# Patient Record
Sex: MALE | Race: WHITE | Employment: UNEMPLOYED | URBAN - NONMETROPOLITAN AREA
[De-identification: names, ages, dates, MRNs, and addresses within clinical notes are randomized per-mention and may not be internally consistent; named-entity substitution may affect disease eponyms.]

---

## 2023-03-07 ENCOUNTER — HOSPITAL ENCOUNTER (EMERGENCY)
Age: 23
Discharge: HOME | End: 2023-03-07
Payer: OTHER GOVERNMENT

## 2023-03-07 ENCOUNTER — APPOINTMENT (OUTPATIENT)
Dept: GENERAL RADIOLOGY | Age: 23
End: 2023-03-07

## 2023-03-07 VITALS
OXYGEN SATURATION: 100 % | WEIGHT: 163.6 LBS | TEMPERATURE: 98.1 F | SYSTOLIC BLOOD PRESSURE: 124 MMHG | DIASTOLIC BLOOD PRESSURE: 67 MMHG | BODY MASS INDEX: 21 KG/M2 | HEIGHT: 74 IN | HEART RATE: 95 BPM | RESPIRATION RATE: 14 BRPM

## 2023-03-07 DIAGNOSIS — S86.911A STRAIN OF RIGHT KNEE, INITIAL ENCOUNTER: Primary | ICD-10-CM

## 2023-03-07 PROCEDURE — 99202 OFFICE O/P NEW SF 15 MIN: CPT | Performed by: NURSE PRACTITIONER

## 2023-03-07 PROCEDURE — 73564 X-RAY EXAM KNEE 4 OR MORE: CPT

## 2023-03-07 PROCEDURE — 99203 OFFICE O/P NEW LOW 30 MIN: CPT

## 2023-03-07 RX ORDER — IBUPROFEN 800 MG/1
800 TABLET ORAL EVERY 8 HOURS PRN
Qty: 30 TABLET | Refills: 0 | Status: SHIPPED | OUTPATIENT
Start: 2023-03-07 | End: 2023-03-17

## 2023-03-07 ASSESSMENT — ENCOUNTER SYMPTOMS
SORE THROAT: 0
SHORTNESS OF BREATH: 0
DIARRHEA: 0
EYE DISCHARGE: 0
VOMITING: 0
EYE REDNESS: 0
NAUSEA: 0
TROUBLE SWALLOWING: 0
RHINORRHEA: 0
COUGH: 0

## 2023-03-07 ASSESSMENT — PAIN SCALES - GENERAL: PAINLEVEL_OUTOF10: 7

## 2023-03-07 ASSESSMENT — PAIN - FUNCTIONAL ASSESSMENT: PAIN_FUNCTIONAL_ASSESSMENT: 0-10

## 2023-03-07 ASSESSMENT — PAIN DESCRIPTION - ORIENTATION: ORIENTATION: RIGHT

## 2023-03-07 ASSESSMENT — PAIN DESCRIPTION - LOCATION: LOCATION: KNEE

## 2023-03-07 NOTE — DISCHARGE INSTRUCTIONS
Take medications as prescribed. Use RICE for pain: Rest, Ice, Compression, Elevation. You may use Tylenol or Motrin per package instructions for pain. You may use a heating pad or ice for 15 minutes 2-3 times daily as needed for comfort. Seek emergency treatment for fever greater than 101.5 for greater than 3 days, increased pain, or new or unusual symptoms.

## 2023-03-07 NOTE — ED PROVIDER NOTES
[facility]  Urgent Care Encounter      CHIEF COMPLAINT       Chief Complaint   Patient presents with    Knee Pain     \" It gave out under me while painting ( not at work)\"       Nurses Notes reviewed and I agree except as noted in the HPI. HISTORY OF [address] Sheron Connell is a 25 y.o. male who presents with right knee pain that started approximately 1 week ago. Patient states he was painting a board on his house and felt the knee \"give out\". He had instant pain and has had intermittent pain since that time. Patient states pain is currently 7/10 but does get up to 10/10 at its worst.  He is not currently taking anything for pain. He is having mild difficulty walking on it and states that it continues to [place] out\". REVIEW OF SYSTEMS     Review of Systems   Constitutional:  Negative for chills, diaphoresis, fatigue and fever. HENT:  Negative for congestion, ear pain, rhinorrhea, sore throat and trouble swallowing. Eyes:  Negative for discharge and redness. Respiratory:  Negative for cough and shortness of breath. Cardiovascular:  Negative for chest pain. Gastrointestinal:  Negative for diarrhea, nausea and vomiting. Genitourinary:  Negative for decreased urine volume. Musculoskeletal:  Positive for joint swelling. Negative for neck pain and neck stiffness. Skin:  Negative for rash. Neurological:  Negative for headaches. Hematological:  Negative for adenopathy. Psychiatric/Behavioral:  Negative for sleep disturbance. 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 CURRENT MEDICATIONS       Previous Medications    No medications on file       ALLERGIES     Patient is has No Known Allergies.     FAMILY HISTORY     Patient'sfamily history includes Cancer in his mother. SOCIAL HISTORY     Patient  reports that he has been smoking cigarettes. He has been smoking an average of 1 pack per day. He does not have any smokeless tobacco history on file. He reports that he does not currently use alcohol. He reports current drug use. Drug: Marijuana Ginny Perez). PHYSICAL EXAM     ED TRIAGE VITALS  BP: 124/67, Temp: 98.1 °F (36.7 °C), Heart Rate: 95, Resp: 14, SpO2: 100 %  Physical Exam  Vitals and nursing note reviewed. Constitutional:       General: He is not in acute distress. Appearance: Normal appearance. He is normal weight. HENT:      Head: Normocephalic and atraumatic. Right Ear: Tympanic membrane normal.      Left Ear: Tympanic membrane normal.      Nose: Nose normal.      Mouth/Throat:      Mouth: Mucous membranes are moist.      Pharynx: Oropharynx is clear. Eyes:      Conjunctiva/sclera: Conjunctivae normal.   Cardiovascular:      Rate and Rhythm: Normal rate and regular rhythm. Heart sounds: Normal heart sounds. Pulmonary:      Effort: Pulmonary effort is normal. No respiratory distress. Breath sounds: Normal breath sounds. No wheezing. Abdominal:      General: Abdomen is flat. Bowel sounds are normal.      Palpations: Abdomen is soft. Musculoskeletal:         General: Normal range of motion. Cervical back: Normal range of motion and neck supple. Right hip: Normal.      Right knee: Swelling present. Normal range of motion. Tenderness present over the medial joint line and MCL. No LCL laxity or MCL laxity. Normal patellar mobility. Instability Tests: Anterior drawer test negative. Posterior drawer test negative. Anterior Lachman test negative. Medial Bobby test negative and lateral Bobby test negative. Left knee: Normal.      Right ankle: Normal.   Lymphadenopathy:      Cervical: No cervical adenopathy. Skin:     General: Skin is warm and dry. Capillary Refill: Capillary refill takes less than 2 seconds. Neurological:      General: No focal deficit present. Mental Status: He is alert and oriented to person, place, and time. Sensory: Sensation is intact. Motor: Motor function is intact. Coordination: Coordination is intact. Gait: Gait is intact. Deep Tendon Reflexes:      Reflex Scores:       Patellar reflexes are 2+ on the right side and 2+ on the left side. Achilles reflexes are 2+ on the right side and 2+ on the left side. Psychiatric:         Mood and Affect: Mood normal.         Behavior: Behavior normal.         Thought Content: Thought content normal.         Judgment: Judgment normal.       DIAGNOSTIC RESULTS   Labs:No results found for this visit on 03/07/23. IMAGING:  XR KNEE RIGHT (MIN 4 VIEWS)   Final Result   No acute process            **This report has been created using voice recognition software. It may contain minor errors which are inherent in voice recognition technology. **      Final report electronically signed by Dr. Yina Mcclure on 3/7/2023 3:47 PM         URGENT CARE COURSE:     Vitals:    03/07/23 1504   BP: 124/67   Pulse: 95   Resp: 14   Temp: 98.1 °F (36.7 °C)   SpO2: 100%   Weight: 163 lb 9.6 oz (74.2 kg)   Height: 6' 2\" (1.88 m)       Medications - No data to display  PROCEDURES:  None  FINAL IMPRESSION      1. Strain of right knee, initial encounter        DISPOSITION/PLAN   DISPOSITION Decision To Discharge 03/07/2023 03:56:38 PM    X-ray shows no acute fracture or tissue injury. Patient will be placed in a knee immobilizer and on crutches. Advised to follow-up with orthopedic [place] in 1 week if no improvement in symptoms. Discharged home in good condition. PATIENT REFERRED TO:  NARAYAN Nix - JENNY  [facility]  [address] [id number] [phone]    In 1 week  If symptoms worsen    [address]  [facility] [address] [employer] [address] [employer] [phone]  In 1 week  If symptoms worsen.  Ortho urgent care is open Monday through Thursday 7 AM to 5 PM and Friday 7 AM to 4 PM.  DISCHARGE MEDICATIONS:  New Prescriptions    No medications on file     There are no discharge medications for this patient.       Samson Lal, NARAYAN - [id number] [employer] , NARAYAN - CNP  03/07/23 1882

## 2024-03-09 ENCOUNTER — HOSPITAL ENCOUNTER (EMERGENCY)
Age: 24
Discharge: HOME | End: 2024-03-10
Attending: STUDENT IN AN ORGANIZED HEALTH CARE EDUCATION/TRAINING PROGRAM

## 2024-03-09 ENCOUNTER — APPOINTMENT (OUTPATIENT)
Dept: CT IMAGING | Age: 24
End: 2024-03-09

## 2024-03-09 VITALS
OXYGEN SATURATION: 99 % | DIASTOLIC BLOOD PRESSURE: 77 MMHG | RESPIRATION RATE: 16 BRPM | HEART RATE: 73 BPM | SYSTOLIC BLOOD PRESSURE: 152 MMHG

## 2024-03-09 DIAGNOSIS — R09.01: Primary | ICD-10-CM

## 2024-03-09 DIAGNOSIS — Z91.89 POTENTIAL FOR INTENTIONAL SELF-HARM: ICD-10-CM

## 2024-03-09 LAB
AMPHETAMINES UR QL SCN: POSITIVE
ANION GAP SERPL CALC-SCNC: 12 MEQ/L (ref 8–16)
APAP SERPL-MCNC: < 5 UG/ML (ref 0–20)
BARBITURATES UR QL SCN: NEGATIVE
BASOPHILS ABSOLUTE: 0.1 THOU/MM3 (ref 0–0.1)
BASOPHILS NFR BLD AUTO: 0.8 %
BENZODIAZ UR QL SCN: NEGATIVE
BUN SERPL-MCNC: 17 MG/DL (ref 7–22)
BZE UR QL SCN: NEGATIVE
CALCIUM SERPL-MCNC: 9.9 MG/DL (ref 8.5–10.5)
CANNABINOIDS UR QL SCN: POSITIVE
CHLORIDE SERPL-SCNC: 101 MEQ/L (ref 98–111)
CO2 SERPL-SCNC: 24 MEQ/L (ref 23–33)
CREAT SERPL-MCNC: 1 MG/DL (ref 0.4–1.2)
DEPRECATED RDW RBC AUTO: 39.5 FL (ref 35–45)
EOSINOPHIL NFR BLD AUTO: 2.8 %
EOSINOPHILS ABSOLUTE: 0.2 THOU/MM3 (ref 0–0.4)
ERYTHROCYTE [DISTWIDTH] IN BLOOD BY AUTOMATED COUNT: 12.8 % (ref 11.5–14.5)
ETHANOL SERPL-MCNC: < 0.01 %
FENTANYL: NEGATIVE
GFR SERPL CREATININE-BSD FRML MDRD: > 60 ML/MIN/1.73M2
GLUCOSE SERPL-MCNC: 100 MG/DL (ref 70–108)
HCT VFR BLD AUTO: 45.3 % (ref 42–52)
HGB BLD-MCNC: 15.6 GM/DL (ref 14–18)
IMM GRANULOCYTES # BLD AUTO: 0.01 THOU/MM3 (ref 0–0.07)
IMM GRANULOCYTES NFR BLD AUTO: 0.1 %
LYMPHOCYTES ABSOLUTE: 1.9 THOU/MM3 (ref 1–4.8)
LYMPHOCYTES NFR BLD AUTO: 25.3 %
MCH RBC QN AUTO: 29.4 PG (ref 26–33)
MCHC RBC AUTO-ENTMCNC: 34.4 GM/DL (ref 32.2–35.5)
MCV RBC AUTO: 85.5 FL (ref 80–94)
MONOCYTES ABSOLUTE: 0.6 THOU/MM3 (ref 0.4–1.3)
MONOCYTES NFR BLD AUTO: 8.4 %
NEUTROPHILS NFR BLD AUTO: 62.6 %
NRBC BLD AUTO-RTO: 0 /100 WBC
OPIATES UR QL SCN: NEGATIVE
OSMOLALITY SERPL CALC.SUM OF ELEC: 275.4 MOSMOL/KG (ref 275–300)
OXYCODONE: NEGATIVE
PCP UR QL SCN: NEGATIVE
PLATELET # BLD AUTO: 255 THOU/MM3 (ref 130–400)
PMV BLD AUTO: 9.3 FL (ref 9.4–12.4)
POTASSIUM SERPL-SCNC: 4.2 MEQ/L (ref 3.5–5.2)
RBC # BLD AUTO: 5.3 MILL/MM3 (ref 4.7–6.1)
SALICYLATES SERPL-MCNC: 0.4 MG/DL (ref 2–10)
SEGMENTED NEUTROPHILS ABSOLUTE COUNT: 4.6 THOU/MM3 (ref 1.8–7.7)
SODIUM SERPL-SCNC: 137 MEQ/L (ref 135–145)
WBC # BLD AUTO: 7.4 THOU/MM3 (ref 4.8–10.8)

## 2024-03-09 PROCEDURE — 82077 ASSAY SPEC XCP UR&BREATH IA: CPT

## 2024-03-09 PROCEDURE — 71250 CT THORAX DX C-: CPT

## 2024-03-09 PROCEDURE — 99285 EMERGENCY DEPT VISIT HI MDM: CPT

## 2024-03-09 PROCEDURE — 80307 DRUG TEST PRSMV CHEM ANLYZR: CPT

## 2024-03-09 PROCEDURE — 80143 DRUG ASSAY ACETAMINOPHEN: CPT

## 2024-03-09 PROCEDURE — 80179 DRUG ASSAY SALICYLATE: CPT

## 2024-03-09 PROCEDURE — 36415 COLL VENOUS BLD VENIPUNCTURE: CPT

## 2024-03-09 PROCEDURE — 70498 CT ANGIOGRAPHY NECK: CPT

## 2024-03-09 PROCEDURE — 6360000004 HC RX CONTRAST MEDICATION: Performed by: STUDENT IN AN ORGANIZED HEALTH CARE EDUCATION/TRAINING PROGRAM

## 2024-03-09 PROCEDURE — 80048 BASIC METABOLIC PNL TOTAL CA: CPT

## 2024-03-09 PROCEDURE — 85025 COMPLETE CBC W/AUTO DIFF WBC: CPT

## 2024-03-09 RX ADMIN — IOPAMIDOL 80 ML: 755 INJECTION, SOLUTION INTRAVENOUS at 20:53

## 2024-03-09 ASSESSMENT — PAIN DESCRIPTION - LOCATION: LOCATION: THROAT

## 2024-03-09 ASSESSMENT — PAIN SCALES - GENERAL: PAINLEVEL_OUTOF10: 3

## 2024-03-09 ASSESSMENT — PAIN DESCRIPTION - DESCRIPTORS: DESCRIPTORS: ACHING

## 2024-03-09 ASSESSMENT — PAIN - FUNCTIONAL ASSESSMENT: PAIN_FUNCTIONAL_ASSESSMENT: 0-10

## 2024-03-10 NOTE — ED TRIAGE NOTES
Patient presents to ED by police with c/o attempting suicide yesterday afternoon. States that he was upset and put a belt around his neck. Patient denies any thoughts of suicide since then. Patient placed in safe room that is ligature resistant with continuous monitoring in place. Provider notified, requested an assessment by behavioral health . Patient belongings secured in a locked lockers outside of the room. Explained suicide prevention precautions to the patient including constant observer.

## 2024-03-10 NOTE — DISCHARGE INSTRUCTIONS
You are seen to the emergency department due to an episode of choking.  Your CT does not show any abnormalities in your neck.  You most likely are having pain due to the soreness around the area.    You ultimately need to follow-up with [facility] for mental health medication management.    You also do not have a family doctor on file.  I am referring you to the family medicine clinic for establishment of care.

## 2024-03-10 NOTE — ED PROVIDER NOTES
[facility] EMERGENCY DEPT    Pt Name: Anurag Gardner  MRN: [id number]  Birthdate 2000  Date of evaluation: 3/9/2024  Resident Physician: Isaura Marcus MD  Supervising Physician: Dr. Peterson Perea DO    CHIEF COMPLAINT       Chief Complaint   Patient presents with    Mental Health Problem    Suicidal       HISTORY OF PRESENT ILLNESS   Anurag Gardner is a 23 y.o. male with PMHx of bipolar 1, PTSD  who presents to the emergency department for evaluation of mental health problem, suicidal.    Patient states that yesterday he put a belt around his neck and was strangulating himself.  Patient denies that this was a suicide attempt.  States that he heard through the [place] that asphyxiation in conjunction with masturbation was pleasurable.  Patient tried that yesterday afternoon.  Patient states that he did not hang from anything for the fixation.  States that he put a belt around his neck and was manually pulling the belt with 1 hand and masturbating with the other hand.    Patient states that after this event, he did not have any issues.  When he went to sleep and woke up the next day, he noticed that he had tonsillar swelling.  Muffled voice, difficulty swallowing.  Patient then looked online to see who to contact for help regarding this.  States that his mother was not around to ask any questions therefore called a hotline to speak with a doctor.    Patient states that he thought that the hotline was confidential however the police were called to his house.  Patient states that he lives at [facility] and there were children around and he is very embarrassed about the situation.    Patient states that he did not try to kill himself.  States that he comes from  family and his mom is very supportive of him.  Patient states that he believes no parent should have to bury their child.  States that he needs to support his mother.    At this time denies any suicidal ideations or homicidal

## 2024-03-10 NOTE — ED NOTES
Patient resting in bed with eyes closed, respirations are even and unlabored. Call light in reach. [place] police will continue to monitor.

## 2025-03-26 ENCOUNTER — HOSPITAL ENCOUNTER (INPATIENT)
Age: 25
LOS: 3 days | Discharge: HOME | End: 2025-03-29
Attending: PSYCHIATRY & NEUROLOGY | Admitting: PSYCHIATRY & NEUROLOGY
Payer: COMMERCIAL

## 2025-03-26 PROBLEM — F31.9 BIPOLAR 1 DISORDER (HCC): Status: ACTIVE | Noted: 2025-03-26

## 2025-03-26 PROCEDURE — 90792 PSYCH DIAG EVAL W/MED SRVCS: CPT | Performed by: PSYCHIATRY & NEUROLOGY

## 2025-03-26 PROCEDURE — 6370000000 HC RX 637 (ALT 250 FOR IP): Performed by: PSYCHIATRY & NEUROLOGY

## 2025-03-26 PROCEDURE — 1240000000 HC EMOTIONAL WELLNESS R&B

## 2025-03-26 RX ORDER — MIRTAZAPINE 7.5 MG/1
7.5 TABLET, FILM COATED ORAL NIGHTLY
Status: DISCONTINUED | OUTPATIENT
Start: 2025-03-26 | End: 2025-03-29 | Stop reason: HOSPADM

## 2025-03-26 RX ORDER — NICOTINE 21 MG/24HR
1 PATCH, TRANSDERMAL 24 HOURS TRANSDERMAL DAILY
Status: DISCONTINUED | OUTPATIENT
Start: 2025-03-26 | End: 2025-03-29 | Stop reason: HOSPADM

## 2025-03-26 RX ORDER — MAGNESIUM HYDROXIDE/ALUMINUM HYDROXICE/SIMETHICONE 120; 1200; 1200 MG/30ML; MG/30ML; MG/30ML
30 SUSPENSION ORAL EVERY 6 HOURS PRN
Status: DISCONTINUED | OUTPATIENT
Start: 2025-03-26 | End: 2025-03-29 | Stop reason: HOSPADM

## 2025-03-26 RX ORDER — HALOPERIDOL 5 MG/ML
5 INJECTION INTRAMUSCULAR EVERY 6 HOURS PRN
Status: DISCONTINUED | OUTPATIENT
Start: 2025-03-26 | End: 2025-03-29 | Stop reason: HOSPADM

## 2025-03-26 RX ORDER — ACETAMINOPHEN 325 MG/1
650 TABLET ORAL EVERY 4 HOURS PRN
Status: DISCONTINUED | OUTPATIENT
Start: 2025-03-26 | End: 2025-03-29 | Stop reason: HOSPADM

## 2025-03-26 RX ORDER — LORAZEPAM 2 MG/ML
2 INJECTION INTRAMUSCULAR EVERY 6 HOURS PRN
Status: DISCONTINUED | OUTPATIENT
Start: 2025-03-26 | End: 2025-03-26 | Stop reason: RX

## 2025-03-26 RX ORDER — LORAZEPAM 2 MG/1
2 TABLET ORAL EVERY 6 HOURS PRN
Status: DISCONTINUED | OUTPATIENT
Start: 2025-03-26 | End: 2025-03-29 | Stop reason: HOSPADM

## 2025-03-26 RX ORDER — HALOPERIDOL 5 MG/1
5 TABLET ORAL EVERY 6 HOURS PRN
Status: DISCONTINUED | OUTPATIENT
Start: 2025-03-26 | End: 2025-03-29 | Stop reason: HOSPADM

## 2025-03-26 RX ORDER — IBUPROFEN 400 MG/1
400 TABLET, FILM COATED ORAL EVERY 6 HOURS PRN
Status: DISCONTINUED | OUTPATIENT
Start: 2025-03-26 | End: 2025-03-29 | Stop reason: HOSPADM

## 2025-03-26 RX ADMIN — MIRTAZAPINE 7.5 MG: 7.5 TABLET, FILM COATED ORAL at 21:28

## 2025-03-26 ASSESSMENT — PATIENT HEALTH QUESTIONNAIRE - PHQ9
6. FEELING BAD ABOUT YOURSELF - OR THAT YOU ARE A FAILURE OR HAVE LET YOURSELF OR YOUR FAMILY DOWN: NEARLY EVERY DAY
SUM OF ALL RESPONSES TO PHQ QUESTIONS 1-9: 22
9. THOUGHTS THAT YOU WOULD BE BETTER OFF DEAD, OR OF HURTING YOURSELF: MORE THAN HALF THE DAYS
4. FEELING TIRED OR HAVING LITTLE ENERGY: NEARLY EVERY DAY
7. TROUBLE CONCENTRATING ON THINGS, SUCH AS READING THE NEWSPAPER OR WATCHING TELEVISION: NEARLY EVERY DAY
SUM OF ALL RESPONSES TO PHQ QUESTIONS 1-9: 24
10. IF YOU CHECKED OFF ANY PROBLEMS, HOW DIFFICULT HAVE THESE PROBLEMS MADE IT FOR YOU TO DO YOUR WORK, TAKE CARE OF THINGS AT HOME, OR GET ALONG WITH OTHER PEOPLE: VERY DIFFICULT
8. MOVING OR SPEAKING SO SLOWLY THAT OTHER PEOPLE COULD HAVE NOTICED. OR THE OPPOSITE, BEING SO FIGETY OR RESTLESS THAT YOU HAVE BEEN MOVING AROUND A LOT MORE THAN USUAL: SEVERAL DAYS
SUM OF ALL RESPONSES TO PHQ QUESTIONS 1-9: 24
SUM OF ALL RESPONSES TO PHQ QUESTIONS 1-9: 24
1. LITTLE INTEREST OR PLEASURE IN DOING THINGS: NEARLY EVERY DAY
3. TROUBLE FALLING OR STAYING ASLEEP: NEARLY EVERY DAY
5. POOR APPETITE OR OVEREATING: NEARLY EVERY DAY
2. FEELING DOWN, DEPRESSED OR HOPELESS: NEARLY EVERY DAY

## 2025-03-26 ASSESSMENT — SLEEP AND FATIGUE QUESTIONNAIRES
DO YOU HAVE DIFFICULTY SLEEPING: YES
DO YOU USE A SLEEP AID: NO
AVERAGE NUMBER OF SLEEP HOURS: 5
SLEEP PATTERN: DIFFICULTY FALLING ASLEEP;RESTLESSNESS

## 2025-03-26 ASSESSMENT — LIFESTYLE VARIABLES
HOW MANY STANDARD DRINKS CONTAINING ALCOHOL DO YOU HAVE ON A TYPICAL DAY: PATIENT DOES NOT DRINK
HOW OFTEN DO YOU HAVE A DRINK CONTAINING ALCOHOL: MONTHLY OR LESS
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
## 2025-03-26 NOTE — PROGRESS NOTES
Patient reports goal for today is to go outside, smoke a cigarette.  Patient continues to work towards goal.
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
## 2025-03-26 NOTE — PROGRESS NOTES
Behavioral Services  Medicare Certification Upon Admission    I certify that this patient's inpatient psychiatric hospital admission is medically necessary for:    [x] (1) Treatment which could reasonably be expected to improve this patient's condition,       [x] (2) Or for diagnostic study;     AND     [x](2) The inpatient psychiatric services are provided while the individual is under the care of a physician and are included in the individualized plan of care.    Estimated length of stay/service 3 to 5 days    Plan for post-hospital care outpatient [facility]    Electronically signed by Nolvia Haney MD on 3/26/2025 at 3:45 PM

## 2025-03-26 NOTE — PATIENT CARE CONFERENCE
Behavioral Health  Initial Interdisciplinary Treatment Plan NOTE    REVIEW DATE AND TIME: 3/26/25 1451    PATIENT was IN TREATMENT TEAM.  See Multidisciplinary Treatment Team sheet for participants.    ADMISSION TYPE:   Admission Type: Involuntary    REASON FOR ADMISSION:  Reason for Admission: \"to get my mental right\"      Estimated Length of Stay Update:  3-5 days  Estimated Discharge Date Update: 3/28/25    Patient Strengths/Barriers  Strengths (Must Choose Two): Sense of humor, Support from family  Barriers: Education, Independent living  Addictive Behavior:Addictive Behavior  In the Past 3 Months, Have You Felt or Has Someone Told You That You Have a Problem With  : None  Medical Problems:  Past Medical History:   Diagnosis Date    Bipolar 1 disorder (HCC)     Cirrhosis (HCC)     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    PTSD (post-traumatic stress disorder)        EDUCATION:   Learner Progress Toward Treatment Goals: Reviewed results and recommendations of this team, Reviewed group plan and strategies, Reviewed signs, symptoms and risk of self harm and violent behavior, and Reviewed goals and plan of care    Method: Individual    Outcome: Verbalized understanding and Demonstrated Understanding    PATIENT GOALS: \"To try to be stable\"    OQ PRIORITIES IDENTIFIED BY THE PATIENT ON ADMISSION: (These are the symptoms that the patient has identified that are impacting them the most at the time of admission based on their own input on the OQ.  These priorities are to be included in all of their care while admitted.)        95 - Suicidal Ideation/Substance Abuse    PLAN/TREATMENT RECOMMENDATIONS UPDATE:   What is the most important thing we can help you with while you are here? See above  Who is your support system? Limited support  Do you have follow-up providers? No one currently. Discussed follow up at [facility]. Patient receptive.   Do you have the ability to pay for your medications? Yes, [facility]  Where will you be  residing when you leave the hospital? Patient is currently homeless. Patient states that he was residing in a camper at a campground in [place] but is unable to return there until April 8 due to another resident having a restraining order against him that expires on April 8. Patient states that he has been staying on the street since this happened a week ago.   Will need a return to work slip or FMLA paper completion? No      GOALS UPDATE:   Time frame for Short-Term Goals: Daily     SOULEYMANE Sepulveda

## 2025-03-26 NOTE — PLAN OF CARE
Problem: Risk for Elopement  Goal: Patient will not exit the unit/facility without proper excort  Outcome: Progressing  Flowsheets (Taken 3/26/2025 0800)  Nursing Interventions for Elopement Risk:   Reduce environmental triggers   Make sure patient has all necessary personal care items  Note: No attempts at elopement noted at this time.     Problem: Self Harm/Suicidality  Goal: Will have no self-injury during hospital stay  Description: INTERVENTIONS:  1.  Ensure constant observer at bedside with Q15M safety checks  2.  Maintain a safe environment  3.  Secure patient belongings  4.  Ensure family/visitors adhere to safety recommendations  5.  Ensure safety tray has been added to patient's diet order  6.  Every shift and PRN: Re-assess suicidal risk via Frequent Screener    Outcome: Progressing  Flowsheets (Taken 3/26/2025 0800)  Will have no self-injury during hospital stay:   Maintain a safe environment   Every shift and PRN: Re-assess suicidal risk via Frequent Screener  Note: Patient remains safe and free from harm. Suicidal thoughts denied at this time.  Care plan reviewed with patient.  Patient does verbalize understanding of the plan of care and does not contribute to goal setting

## 2025-03-26 NOTE — H&P
Department of Psychiatry  Attending Physician Psychiatric Assessment     Reason for Admission to Psychiatric Unit:  Threat to self requiring 24 hour professional observation  Concerns about patient's safety in the community    CHIEF COMPLAINT:  Suicide attempt by hanging self    History obtained from:  patient, electronic medical record and family members    HISTORY OF PRESENT ILLNESS:    Anurag Gardner is a 24 y.o. male with significant past psychiatric history of polysubstance abuse-cannabis, meth who is transferred from [facility] where he initially presented for concerns of trying to hang self.  There are some concerns that he has been trying to hang self for sexual arousal however patient was also acting erratic following which he was placed on an EMC.  Patient reports that he is currently withdrawing from methamphetamines.  Was unable to give me a clear indication on why he was trying to strangulate himself.  Reports that he has been homeless which has been an ongoing stressor for him.  Reports some feelings of helplessness and hopelessness secondary to.  Reports poor sleep and appetite secondary to significant methamphetamine abuse.  Could not inset any manic or hypomanic symptoms.  Could not elicit any psychotic symptoms today.    PSYCHIATRIC HISTORY: Patient mentions he has a history of depression and ADHD.  Currently not seeing any psychiatrist.  Reports that he tried to kill self multiple times in the past.  Reports that he was admitted at least 8 times in the past.    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

## 2025-03-27 PROBLEM — R45.851 DEPRESSION WITH SUICIDAL IDEATION: Status: ACTIVE | Noted: 2025-03-26

## 2025-03-27 PROBLEM — F32.A DEPRESSION WITH SUICIDAL IDEATION: Status: ACTIVE | Noted: 2025-03-26

## 2025-03-27 PROCEDURE — 6370000000 HC RX 637 (ALT 250 FOR IP): Performed by: PSYCHIATRY & NEUROLOGY

## 2025-03-27 PROCEDURE — 1240000000 HC EMOTIONAL WELLNESS R&B

## 2025-03-27 PROCEDURE — APPSS30 APP SPLIT SHARED TIME 16-30 MINUTES: Performed by: PHYSICIAN ASSISTANT

## 2025-03-27 RX ORDER — TRAZODONE HYDROCHLORIDE 50 MG/1
50 TABLET ORAL NIGHTLY PRN
Status: DISCONTINUED | OUTPATIENT
Start: 2025-03-27 | End: 2025-03-29 | Stop reason: HOSPADM

## 2025-03-27 RX ORDER — QUETIAPINE FUMARATE 100 MG/1
100 TABLET, FILM COATED ORAL ONCE
Status: COMPLETED | OUTPATIENT
Start: 2025-03-28 | End: 2025-03-28

## 2025-03-27 RX ADMIN — MIRTAZAPINE 7.5 MG: 7.5 TABLET, FILM COATED ORAL at 21:24

## 2025-03-27 RX ADMIN — TRAZODONE HYDROCHLORIDE 50 MG: 50 TABLET ORAL at 23:08

## 2025-03-27 ASSESSMENT — PAIN SCALES - GENERAL: PAINLEVEL_OUTOF10: 0

## 2025-03-27 NOTE — BH NOTE
INPATIENT RECREATIONAL THERAPY  ADULT BEHAVIORAL SERVICES  EVALUATION    REFERRING PHYSICIAN:    DIAGNOSIS:     PRECAUTIONS:    HISTORY OF PRESENT ILLNESS/INJURY:   PMH:  Please see medical chart for prior medical history, allergies, and medicatio    HISTORY OF PSYCHIATRIC TREATMENT:  DATE OF BIRTH:    GENDER:    MARITAL STATUS:    EMPLOYMENT STATUS:    LIVING SITUATION/SUPPORT:    EDUCATIONAL LEVEL:   MEDICATION/DRUG USE:    LEISURE INTERESTS:    ACTIVITY PREFERENCE: MORENA  ACTIVITY TYPES: MORENA   COGNITION: MORENA    COPING: MORENA  ATTENTION: MORENA  RELAXATION: MORENA  SELF-ESTEEM:MORENA  MOTIVATION: [facility]     SOCIAL SKILLS:  [facility]  FRUSTRATION TOLERANCE:  [facility]  ATTENTION SEEKING: MORENA  COOPERATION: MORENA  AFFECT: blunt-flat  APPEARANCE: pt. Displays fair grooming and hygiene and wears personal attire    HEARING:  WNL  VISION:  WNL   VERBAL COMMUNICATION:  no impairment noted at this time  WRITTEN COMMUNICATION:  no impairment noted at this time    COORDINATION: no impairment noted at this time   MOBILITY: pt. Is able to ambulate independently     GOALS: .to increase socialization and knowledge of coping skills through participation in group therapy sessions following verbal encouragement from staff.

## 2025-03-27 NOTE — PLAN OF CARE
Problem: Risk for Elopement  Goal: Patient will not exit the unit/facility without proper excort  Outcome: Progressing  Flowsheets (Taken 3/27/2025 1317)  Nursing Interventions for Elopement Risk:   Make sure patient has all necessary personal care items   Reduce environmental triggers  Note: Patient has not been observed to be checking the exit doors or demonstrating behaviors of attempting to leave the unit.       Problem: Sleep Disturbance  Goal: Will exhibit normal sleeping pattern  Description: INTERVENTIONS:  1. Administer medication as ordered  2. Decrease environmental stimuli, including noise, as appropriate  3. Discourage social isolation and naps during the day  Outcome: Progressing     Problem: Involuntary Admit  Goal: Will cooperate with staff recommendations and doctor's orders and will demonstrate appropriate behavior  Description: INTERVENTIONS:  1. Treat underlying conditions and offer medication as ordered  2. Educate regarding involuntary admission procedures and rules  3. Contain excessive/inappropriate behavior per unit and hospital policies  Outcome: Progressing  Flowsheets (Taken 3/27/2025 1317)  Will cooperate with staff recommendations and doctor's orders and will demonstrate appropriate behavior: Educate regarding involuntary admission procedures and rules     Problem: Self Harm/Suicidality  Goal: Will have no self-injury during hospital stay  Description: INTERVENTIONS:  1.  Ensure constant observer at bedside with Q15M safety checks  2.  Maintain a safe environment  3.  Secure patient belongings  4.  Ensure family/visitors adhere to safety recommendations  5.  Ensure safety tray has been added to patient's diet order  6.  Every shift and PRN: Re-assess suicidal risk via Frequent Screener    Outcome: Not Progressing  Flowsheets (Taken 3/27/2025 1317)  Will have no self-injury during hospital stay:   Maintain a safe environment   Every shift and PRN: Re-assess suicidal risk via Frequent

## 2025-03-27 NOTE — PLAN OF CARE
Problem: Self Harm/Suicidality  Goal: Will have no self-injury during hospital stay  Description: INTERVENTIONS:  1.  Ensure constant observer at bedside with Q15M safety checks  2.  Maintain a safe environment  3.  Secure patient belongings  4.  Ensure family/visitors adhere to safety recommendations  5.  Ensure safety tray has been added to patient's diet order  6.  Every shift and PRN: Re-assess suicidal risk via Frequent Screener    3/27/2025 1317 by Gerardo Toth RN  Outcome: Not Progressing  Flowsheets (Taken 3/27/2025 1317)  Will have no self-injury during hospital stay:   Maintain a safe environment   Every shift and PRN: Re-assess suicidal risk via Frequent Screener  Note: No self harm behaviors were observed or reported so far this shift. Remains on every 15 minutes precautions for safety.  Patient denies suicidal ideations at present time       Problem: Anxiety  Goal: Will report anxiety at manageable levels  Description: INTERVENTIONS:  1. Administer medication as ordered  2. Teach and rehearse alternative coping skills  3. Provide emotional support with 1:1 interaction with staff  3/27/2025 1317 by Gerardo Toth RN  Outcome: Not Progressing  Flowsheets (Taken 3/27/2025 1317)  Will report anxiety at manageable levels:   Administer medication as ordered   Teach and rehearse alternative coping skills   Provide emotional support with 1:1 interaction with staff     Problem: Coping  Goal: Pt/Family able to verbalize concerns and demonstrate effective coping strategies  Description: INTERVENTIONS:  1. Assist patient/family to identify coping skills, available support systems and cultural and spiritual values  2. Provide emotional support, including active listening and acknowledgement of concerns of patient and caregivers  3. Reduce environmental stimuli, as able  4. Instruct patient/family in relaxation techniques, as appropriate  5. Assess for spiritual pain/suffering and initiate Spiritual Care,

## 2025-03-27 NOTE — PLAN OF CARE
Problem: Risk for Elopement  Goal: Patient will not exit the unit/facility without proper excort  3/26/2025 2155 by Bryanna Samaniego, RN  Outcome: Progressing  Flowsheets (Taken 3/26/2025 0800 by Frida Cordero, RN)  Nursing Interventions for Elopement Risk:   Reduce environmental triggers   Make sure patient has all necessary personal care items  Note: Patient has had no elopement attempts while on the unit.   3/26/2025 1701 by Frida Cordero, RN  Outcome: Progressing  Flowsheets (Taken 3/26/2025 0800)  Nursing Interventions for Elopement Risk:   Reduce environmental triggers   Make sure patient has all necessary personal care items  Note: No attempts at elopement noted at this time.     Problem: Self Harm/Suicidality  Goal: Will have no self-injury during hospital stay  Description: INTERVENTIONS:  1.  Ensure constant observer at bedside with Q15M safety checks  2.  Maintain a safe environment  3.  Secure patient belongings  4.  Ensure family/visitors adhere to safety recommendations  5.  Ensure safety tray has been added to patient's diet order  6.  Every shift and PRN: Re-assess suicidal risk via Frequent Screener    3/26/2025 2155 by Bryanna Samaniego, RN  Outcome: Progressing  Flowsheets (Taken 3/26/2025 0800 by Frida Cordero, RN)  Will have no self-injury during hospital stay:   Maintain a safe environment   Every shift and PRN: Re-assess suicidal risk via Frequent Screener  Note: Patient denies thoughts of self harm this shift.   3/26/2025 1701 by Frida Cordero, RN  Outcome: Progressing  Flowsheets (Taken 3/26/2025 0800)  Will have no self-injury during hospital stay:   Maintain a safe environment   Every shift and PRN: Re-assess suicidal risk via Frequent Screener  Note: Patient remains safe and free from harm. Suicidal thoughts denied at this time.     Problem: Safety - Adult  Goal: Free from fall injury  3/26/2025 1701 by Frida Cordero, RN  Outcome: Completed     Problem: Anxiety  Goal: Will

## 2025-03-27 NOTE — PROGRESS NOTES
Department of Psychiatry  Progress Note     Chief Complaint:  Suicide attempt by hanging self     PROGRESS:  Anurag was seen in his room. He had his head under the covers.  He stated that he is \"fucking pissed off\" about being here.  When asked what led to his admission, he said \"some crap on the precipice of wanting to take my life.\"  He was asked if he was planning on going back to the [facility]. He said he does not know if he is able to go back there. Per Brenda LITTLE another resident at the [facility] put a  against him.   He denied any suicidal ideation today.   He denied any hallucinations. Nursing staff reported he was talking to himself and making \"horse\" noises yesterday when he was alone in his room.   He denied any psychotic symptoms to his nurse during the nursing assessment today.  He then became dismissive and refused to answer any further questions.     Staff reported he slept 8 hours broken last night. He did eat breakfast this morning. He did take the Remeron last night. No side effects were reported to staff. He has been isolative to his room today and has not been attending groups.     Of note, Yonas from [place] informed me that the patient admitted to him that he has been dealing drugs, specifically meth. He told Yonas he was given 2 kilos of meth and was told to \"get rid of it.\" He then told Yonas that he has been dealing with the cartel \"the one [employer] is fighting.\" He told Yonas he is going back to the \"[facility]\" to get his stuff and get the \"hell out of [place] and go back to [place].\" He also told Yonas that he has been to treatment in the past and \"found me\" when he was in [place]    Suicidal ideations: denies    Compliance with medications: good to staff and MAR   Medication side effects: none reported to staff   ROS: Patient has new complaints:  no  Sleep quality:  8 hours broken last night  Attending groups: no      OBJECTIVE      Medications  Current Facility-Administered Medications:

## 2025-03-28 PROCEDURE — 1240000000 HC EMOTIONAL WELLNESS R&B

## 2025-03-28 PROCEDURE — APPSS30 APP SPLIT SHARED TIME 16-30 MINUTES: Performed by: PHYSICIAN ASSISTANT

## 2025-03-28 PROCEDURE — 6370000000 HC RX 637 (ALT 250 FOR IP): Performed by: PSYCHIATRY & NEUROLOGY

## 2025-03-28 RX ADMIN — MIRTAZAPINE 7.5 MG: 7.5 TABLET, FILM COATED ORAL at 20:54

## 2025-03-28 RX ADMIN — QUETIAPINE FUMARATE 100 MG: 100 TABLET ORAL at 00:02

## 2025-03-28 RX ADMIN — TRAZODONE HYDROCHLORIDE 50 MG: 50 TABLET ORAL at 20:54

## 2025-03-28 NOTE — PATIENT CARE CONFERENCE
Behavioral Health   Day 3 Interdisciplinary Treatment Plan NOTE    Review Date & Time: 3/8/25 1546    Patient was in treatment team    Admission Type:   Admission Type: Involuntary    Reason for admission:  Reason for Admission: \"to get my mental right\"  Estimated Length of Stay Update:  1-2 days  Estimated Discharge Date Update: 3/30/25    Patient Strengths/Barriers  Strengths (Must Choose Two): Sense of humor, Support from family  Barriers: Education, Independent living  Addictive Behavior:Addictive Behavior  In the Past 3 Months, Have You Felt or Has Someone Told You That You Have a Problem With  : None  Medical Problems:  Past Medical History:   Diagnosis Date    Bipolar 1 disorder (HCC)     Cirrhosis (HCC)     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    PTSD (post-traumatic stress disorder)        Risk:  Fall Risk   Quirino Scale Quirino Scale Score: 22    Status EXAM:   Mental Status and Behavioral Exam  Normal: No  Level of Assistance: Independent/Self  Facial Expression: Flat  Affect: Blunt  Level of Consciousness: Alert  Frequency of Checks: 4 times per hour, close  Mood:Normal: No  Mood: Anxious, Irritable  Motor Activity:Normal: Yes  Motor Activity: Increased  Eye Contact: Poor  Observed Behavior: Cooperative  Sexual Misconduct History: Current - no  Preception: [place] to person, [place] to time, [place] to place  Attention:Normal: No  Attention: Distractible, Unable to concentrate  Thought Processes: Circumstantial  Thought Content:Normal: No  Thought Content: Paranoia  Depression Symptoms: No problems reported or observed.  Anxiety Symptoms: Generalized  Leticia Symptoms: Less need to sleep, Poor judgment  Hallucinations: None  Delusions: Yes  Delusions: Paranoid  Memory:Normal: Yes  Memory: Confabulation  Insight and Judgment: No  Insight and Judgment: Poor judgment, Poor insight, Unmotivated, Unrealistic    Daily Assessment Last Entry:   Daily Sleep (WDL): Exceptions to WDL            Daily Nutrition (WDL): Within Defined

## 2025-03-28 NOTE — PROGRESS NOTES
Pt is unable to sleep was given trazodone at 2308. Pt states Seroquel works best for him.  Call placed to Dr Haney see new orders.

## 2025-03-28 NOTE — PROGRESS NOTES
Department of Psychiatry  Progress Note     Chief Complaint:  Suicide attempt by hanging self      PROGRESS:  Anurag was seen in his room. He had his head under the covers.  He stated that he is \"feels like crap\" today  He then said \"I got legal issues and this isn't helping. Lynsey been fucking pink slipped for a week now.\"   He denied any suicidal ideation today.   He did admit to have thoughts of harming other people \"frequently.\"  He then said \"I want to strangle someone with a damn pillow.\" He then said \"Im joking.\"   He denied any hallucinations.   He denied any delusions at this time. He denied any to staff today as well.   He then became dismissive and refused to answer any further questions.      He told his nurse Mulu today his mood was \"pissed off.\" Staff reported he slept 3 hours broken last night. He did eat breakfast this morning. He did take the Remeron last night. No side effects were reported to staff. He has been isolative to his room today and has not been attending groups. He does come out to watch TV at times.      Suicidal ideations: denies    Compliance with medications: good per staff and MAR   Medication side effects: none reported to staff   ROS: Patient has new complaints:  no  Sleep quality:  3 hours broken last night  Attending groups: no      OBJECTIVE      Medications  Current Facility-Administered Medications: traZODone (DESYREL) tablet 50 mg, 50 mg, Oral, Nightly PRN  acetaminophen (TYLENOL) tablet 650 mg, 650 mg, Oral, Q4H PRN  ibuprofen (ADVIL;MOTRIN) tablet 400 mg, 400 mg, Oral, Q6H PRN  magnesium hydroxide (MILK OF MAGNESIA) 400 MG/5ML suspension 30 mL, 30 mL, Oral, Daily PRN  aluminum & magnesium hydroxide-simethicone (MAALOX PLUS) 200-200-20 MG/5ML suspension 30 mL, 30 mL, Oral, Q6H PRN  haloperidol lactate (HALDOL) injection 5 mg, 5 mg, IntraMUSCular, Q6H PRN **AND** [DISCONTINUED] LORazepam (ATIVAN) injection 2 mg, 2 mg, IntraMUSCular, Q6H PRN  haloperidol (HALDOL) tablet 5 mg,  electronic signature was used to authenticate this note.     **This report has been created using voice recognition software. It may contain minor errors which are inherent in voice recognition technology.**

## 2025-03-28 NOTE — PROGRESS NOTES
During 15 minute safety checks when exiting room patient began screaming and cursing in room at nurse for coming in his room.

## 2025-03-28 NOTE — PROGRESS NOTES
This RN has reviewed Mulu Puente LPN's data collection and has collaborated with this LPN regarding the patient's care plan.

## 2025-03-28 NOTE — PLAN OF CARE
Problem: Risk for Elopement  Goal: Patient will not exit the unit/facility without proper excort  3/28/2025 0221 by Claribel Fisher RN  Outcome: Progressing  Flowsheets (Taken 3/28/2025 0221)  Nursing Interventions for Elopement Risk:   Communicate to physician the risk for elopement   Reduce environmental triggers  Note: No issues with elopement this shift.   3/27/2025 1317 by Gerardo Toth RN  Outcome: Progressing  Flowsheets (Taken 3/27/2025 1317)  Nursing Interventions for Elopement Risk:   Make sure patient has all necessary personal care items   Reduce environmental triggers  Note: Patient has not been observed to be checking the exit doors or demonstrating behaviors of attempting to leave the unit.       Problem: Self Harm/Suicidality  Goal: Will have no self-injury during hospital stay  Description: INTERVENTIONS:  1.  Ensure constant observer at bedside with Q15M safety checks  2.  Maintain a safe environment  3.  Secure patient belongings  4.  Ensure family/visitors adhere to safety recommendations  5.  Ensure safety tray has been added to patient's diet order  6.  Every shift and PRN: Re-assess suicidal risk via Frequent Screener    3/28/2025 0221 by Claribel Fisher RN  Outcome: Progressing  Flowsheets (Taken 3/28/2025 0221)  Will have no self-injury during hospital stay:   Maintain a safe environment   Every shift and PRN: Re-assess suicidal risk via Frequent Screener  Note: Denies suicidal ideations this shift.   3/27/2025 1317 by Gerardo Toth RN  Outcome: Not Progressing  Flowsheets (Taken 3/27/2025 1317)  Will have no self-injury during hospital stay:   Maintain a safe environment   Every shift and PRN: Re-assess suicidal risk via Frequent Screener  Note: No self harm behaviors were observed or reported so far this shift. Remains on every 15 minutes precautions for safety.  Patient denies suicidal ideations at present time       Problem: Anxiety  Goal: Will report anxiety at manageable

## 2025-03-28 NOTE — PROGRESS NOTES
Pt is in room agitated, and yelling due to the tech coming in completing 15 minute safety checks.  Advised of the importance of safety checks and that everyone on the unit gets 15 minute safety checks. Pt became upset states \"no they don't. Only the people who are suicidal\". Again redirected patient on unit policy.

## 2025-03-28 NOTE — PROGRESS NOTES
Discharge planning- [place] is to go to [place] for a diagnostic assessment with [facility] on Wednesday, April 2 at 1:45pm.

## 2025-03-28 NOTE — PLAN OF CARE
Problem: Sleep Disturbance  Goal: Will exhibit normal sleeping pattern  Description: INTERVENTIONS:  1. Administer medication as ordered  2. Decrease environmental stimuli, including noise, as appropriate  3. Discourage social isolation and naps during the day  3/28/2025 1313 by Mulu Puente LPN  Outcome: Not Progressing  Note: Slept 3 Broken last night. Patient has been sleeping most of the day today.   3/28/2025 0221 by Claribel Fisher, RN  Outcome: Progressing  Note: Slept 8 hours last evening. Requested something for sleep was given Trazodone and then called Dr Haney and was given Sseroquel per order.        Problem: Involuntary Admit  Goal: Will cooperate with staff recommendations and doctor's orders and will demonstrate appropriate behavior  Description: INTERVENTIONS:  1. Treat underlying conditions and offer medication as ordered  2. Educate regarding involuntary admission procedures and rules  3. Contain excessive/inappropriate behavior per unit and hospital policies  3/28/2025 1313 by Mulu Puente LPN  Outcome: Not Progressing  Flowsheets (Taken 3/28/2025 0221 by Claribel Fisher, RN)  Will cooperate with staff recommendations and doctor's orders and will demonstrate appropriate behavior:   Treat underlying conditions and offer medication as ordered   Educate regarding involuntary admission procedures and rules  Note: Isolative, irritable, annoyed   3/28/2025 0221 by Claribel Fisher RN  Outcome: Progressing  Flowsheets (Taken 3/28/2025 0221)  Will cooperate with staff recommendations and doctor's orders and will demonstrate appropriate behavior:   Treat underlying conditions and offer medication as ordered   Educate regarding involuntary admission procedures and rules       Problem: Psychosis  Goal: Will report no hallucinations or delusions  Description: INTERVENTIONS:  1. Administer medication as  ordered  2. Assist with reality testing to support increasing orientation  3. Assess if

## 2025-03-29 VITALS
DIASTOLIC BLOOD PRESSURE: 86 MMHG | OXYGEN SATURATION: 99 % | HEIGHT: 75 IN | RESPIRATION RATE: 18 BRPM | SYSTOLIC BLOOD PRESSURE: 128 MMHG | BODY MASS INDEX: 19.89 KG/M2 | TEMPERATURE: 97.6 F | HEART RATE: 96 BPM | WEIGHT: 160 LBS

## 2025-03-29 PROCEDURE — 5130000000 HC BRIDGE APPOINTMENT

## 2025-03-29 PROCEDURE — 99239 HOSP IP/OBS DSCHRG MGMT >30: CPT | Performed by: PSYCHIATRY & NEUROLOGY

## 2025-03-29 RX ORDER — MIRTAZAPINE 7.5 MG/1
7.5 TABLET, FILM COATED ORAL NIGHTLY
Qty: 30 TABLET | Refills: 0 | Status: SHIPPED | OUTPATIENT
Start: 2025-03-29

## 2025-03-29 ASSESSMENT — PAIN SCALES - GENERAL: PAINLEVEL_OUTOF10: 0

## 2025-03-29 NOTE — PROGRESS NOTES
Behavioral Health   Discharge Note    Pt discharged with followings belongings:   Dental Appliances: None  Vision - Corrective Lenses: None  Hearing Aid: None  Jewelry: None  Body Piercings Removed: N/A  Clothing: Footwear, Pants, Shirt, Jacket/Coat  Other Valuables: Credit/Debit Card, Drug [place]   Valuables retrieved from safe, security envelope number:  n/a and returned to patient.  Patient left department with this RN via ambulatory.  [facility]charged to [facility] home. \"An Important Message from Medicare About Your Rights\" (IMM) form photocopy original from admission and provided to pt at least 4 hours prior to discharge N/A. \"An Important Message from [employer] About Your Rights\" (IMM) form photocopy original from admission. N/A. If pt left within 4 hours of receiving 2nd delivery of IMM, this is because pt was agreeable with hospital discharge.  Patient/guardian education on aftercare instructions: Yes  Bridge appointment completed:  yes.  Reviewed Discharge Instructions with patient/family/nursing facility.  Patient/family verbalizes understanding and agreement with the discharge plan using the teachback method.   Information faxed to  by  Patient/family verbalize understanding of AVS:Yes    Status EXAM upon discharge:  Mental Status and Behavioral Exam  Normal: No  Level of Assistance: Independent/Self  Facial Expression: Avoids Gaze, Hostile  Affect: Blunt, Inappropriate  Level of Consciousness: Somnolent  Frequency of Checks: 4 times per hour, close  Mood:Normal: No  Mood: Labile, Irritable  Motor Activity:Normal: No  Motor Activity: Decreased, Agitated  Eye Contact: Poor  Observed Behavior: Withdrawn, Impulsive  Sexual Misconduct History: Current - no  Preception: [place] to person, [place] to place, [place] to time, [place] to situation  Attention:Normal: No  Attention: Distractible  Thought Processes: Unremarkable  Thought Content:Normal: Yes  Thought Content: Paranoia  Depression Symptoms: Sleep

## 2025-03-29 NOTE — DISCHARGE INSTRUCTIONS
[facility] Hotline:  [phone]    Crisis phone numbers:  [facility], and [facility] [phone].  [facility], and [facility] [phone]  [facility] [phone].  [facility] [phone].  [facility] [phone].  [facility], [facility] [phone].    [facility]  [address], [place] [address]  [phone]    [facility] [place] [facility]  [address] [facility]  [address] [place], [place] [address]  [place], [place] [id number] [phone]    [facility]  [address] [address] [address] [facility] [address]  [phone]    [facility]  Rec[facility]  [address] [place] [address]  [phone]    [facility]  [address] [place] [address]  [phone]    [facility]  [address] [place], [place] [id number] [phone]    [facility]  [address] [place] [address]  [phone]    [facility]  Recovery and Wellness Center  [address], [place] [id number] [phone]    [facility]  [address] [address]  [phone]    [address] [address]  [phone]    [facility]  [address] [place], [place] [address] [phone]    [facility]  [address] [place] [address]  [phone]    [facility]  [address] [place], [place] [address] [phone]

## 2025-03-29 NOTE — PLAN OF CARE
Problem: Sleep Disturbance  Goal: Will exhibit normal sleeping pattern  Description: INTERVENTIONS:  1. Administer medication as ordered  2. Decrease environmental stimuli, including noise, as appropriate  3. Discourage social isolation and naps during the day  3/28/2025 2210 by Aylin Patel RN  Note: Patient slept 3 hours broken last night. Reported to have been sleeping during the day.      Problem: Involuntary Admit  Goal: Will cooperate with staff recommendations and doctor's orders and will demonstrate appropriate behavior  Description: INTERVENTIONS:  1. Treat underlying conditions and offer medication as ordered  2. Educate regarding involuntary admission procedures and rules  3. Contain excessive/inappropriate behavior per unit and hospital policies  3/28/2025 2210 by Aylin Patel RN  Flowsheets (Taken 3/28/2025 2125)  Will cooperate with staff recommendations and doctor's orders and will demonstrate appropriate behavior: Treat underlying conditions and offer medication as ordered  Note: Patient cooperative with staff and doctor recommendations and demonstrates appropriate behavior.       Problem: Psychosis  Goal: Will report no hallucinations or delusions  Description: INTERVENTIONS:  1. Administer medication as  ordered  2. Assist with reality testing to support increasing orientation  3. Assess if patient's hallucinations or delusions are encouraging self harm or harm to others and intervene as appropriate  3/28/2025 2210 by Aylin Patel RN  Note: Patient denies hallucinations or delusions so far this shift.       Problem: Risk for Elopement  Goal: Patient will not exit the unit/facility without proper excort  3/28/2025 2210 by Aylin Patel RN  Flowsheets (Taken 3/28/2025 2125)  Nursing Interventions for Elopement Risk:   Make sure patient has all necessary personal care items   Reduce environmental triggers  Note: Patient has not been observed to be checking the exit  social isolation and naps during the day  3/28/2025 2210 by Aylin Patel RN  Note: Patient slept 3 hours broken last night. Reported to have been sleeping during the day.   3/28/2025 1313 by Mulu Puente LPN  Outcome: Not Progressing  Note: Slept 3 Broken last night. Patient has been sleeping most of the day today.      Problem: Involuntary Admit  Goal: Will cooperate with staff recommendations and doctor's orders and will demonstrate appropriate behavior  Description: INTERVENTIONS:  1. Treat underlying conditions and offer medication as ordered  2. Educate regarding involuntary admission procedures and rules  3. Contain excessive/inappropriate behavior per unit and hospital policies  3/28/2025 2210 by Aylin Patel RN  Flowsheets (Taken 3/28/2025 2125)  Will cooperate with staff recommendations and doctor's orders and will demonstrate appropriate behavior: Treat underlying conditions and offer medication as ordered  Note: Patient cooperative with staff and doctor recommendations and demonstrates appropriate behavior.    3/28/2025 1313 by Mulu Puente LPN  Outcome: Not Progressing  Flowsheets (Taken 3/28/2025 0221 by Claribel Fisher RN)  Will cooperate with staff recommendations and doctor's orders and will demonstrate appropriate behavior:   Treat underlying conditions and offer medication as ordered   Educate regarding involuntary admission procedures and rules  Note: Isolative, irritable, annoyed      Problem: Psychosis  Goal: Will report no hallucinations or delusions  Description: INTERVENTIONS:  1. Administer medication as  ordered  2. Assist with reality testing to support increasing orientation  3. Assess if patient's hallucinations or delusions are encouraging self harm or harm to others and intervene as appropriate  3/28/2025 2210 by Aylin Patel RN  Note: Patient denies hallucinations or delusions so far this shift.    3/28/2025 1313 by Mulu Puente LPN  Outcome: Not

## 2025-03-29 NOTE — DISCHARGE INSTR - DIET

## 2025-03-29 NOTE — DISCHARGE SUMMARY
Provider Discharge Summary     Patient ID:  Anurag Gardner  [id number]  24 y.o.  2000    Admit date: 3/26/2025    Discharge date and time: 3/29/2025  2:43 PM     Admitting Physician: Nolvia Haney MD     Discharge Physician: Luis F Chi MD    Admission Diagnoses: Bipolar disorder, unspecified (HCC) [F31.9]  Bipolar 1 disorder (HCC) [F31.9]    Discharge Diagnoses:      Depression with suicidal ideation     Patient Active Problem List   Diagnosis Code    Depression with suicidal ideation F32.A, R45.851        Admission Condition: poor    Discharged Condition: stable    Indication for Admission: threat to self    History of Present Illnes (present tense wording is of findings from admission exam and are not necessarily indicative of current findings):   Anurag Gardner is a 24 y.o. male with significant past psychiatric history of polysubstance abuse-cannabis, meth who is transferred from [facility] where he initially presented for concerns of trying to hang self.  There are some concerns that he has been trying to hang self for sexual arousal however patient was also acting erratic following which he was placed on an EMC.  Patient reports that he is currently withdrawing from methamphetamines.  Was unable to give me a clear indication on why he was trying to strangulate himself.  Reports that he has been homeless which has been an ongoing stressor for him.  Reports some feelings of helplessness and hopelessness secondary to.  Reports poor sleep and appetite secondary to significant methamphetamine abuse.  Could not inset any manic or hypomanic symptoms.  Could not elicit any psychotic symptoms today.  Hospital Course:   Upon admission, Anurag Gardner was provided a safe secure environment, introduced to unit milieu. Remeron was started for mood as well as stimulant use disorder. Patient maintained to himself for most of hospitalization but consistently denied suicidal thoughts during

## 2025-04-03 ENCOUNTER — TELEPHONE (OUTPATIENT)
Age: 25
End: 2025-04-03

## 2025-04-03 NOTE — TELEPHONE ENCOUNTER
Follow up call to patient to evaluate if they had questions related to the recent stay or discharge.  Number provided by patient was not valid so no message with contact information could be left.